# Patient Record
Sex: FEMALE | Race: WHITE | NOT HISPANIC OR LATINO | URBAN - METROPOLITAN AREA
[De-identification: names, ages, dates, MRNs, and addresses within clinical notes are randomized per-mention and may not be internally consistent; named-entity substitution may affect disease eponyms.]

---

## 2019-09-23 ENCOUNTER — EMERGENCY (EMERGENCY)
Facility: HOSPITAL | Age: 19
LOS: 1 days | Discharge: HOME | End: 2019-09-23
Admitting: EMERGENCY MEDICINE
Payer: SELF-PAY

## 2019-09-23 VITALS
HEART RATE: 99 BPM | WEIGHT: 108.03 LBS | HEIGHT: 63.78 IN | TEMPERATURE: 98 F | SYSTOLIC BLOOD PRESSURE: 124 MMHG | OXYGEN SATURATION: 97 % | DIASTOLIC BLOOD PRESSURE: 77 MMHG | RESPIRATION RATE: 16 BRPM

## 2019-09-23 PROCEDURE — 71046 X-RAY EXAM CHEST 2 VIEWS: CPT

## 2019-09-23 PROCEDURE — 94640 AIRWAY INHALATION TREATMENT: CPT

## 2019-09-23 PROCEDURE — 99284 EMERGENCY DEPT VISIT MOD MDM: CPT

## 2019-09-23 PROCEDURE — 99283 EMERGENCY DEPT VISIT LOW MDM: CPT | Mod: 25

## 2019-09-23 PROCEDURE — 71046 X-RAY EXAM CHEST 2 VIEWS: CPT | Mod: 26

## 2019-09-23 RX ORDER — IPRATROPIUM/ALBUTEROL SULFATE 18-103MCG
3 AEROSOL WITH ADAPTER (GRAM) INHALATION
Refills: 0 | Status: COMPLETED | OUTPATIENT
Start: 2019-09-23 | End: 2019-09-23

## 2019-09-23 RX ADMIN — Medication 3 MILLILITER(S): at 17:35

## 2019-09-23 NOTE — ED ADULT NURSE NOTE - NSIMPLEMENTINTERV_GEN_ALL_ED
Implemented All Fall with Harm Risk Interventions:  [place] to call system. Call bell, personal items and telephone within reach. Instruct patient to call for assistance. Room bathroom lighting operational. Non-slip footwear when patient is off stretcher. Physically safe environment: no spills, clutter or unnecessary equipment. Stretcher in lowest position, wheels locked, appropriate side rails in place. Provide visual cue, wrist band, yellow gown, etc. Monitor gait and stability. Monitor for mental status changes and reorient to person, place, and time. Review medications for side effects contributing to fall risk. Reinforce activity limits and safety measures with patient and family. Provide visual clues: red socks.

## 2019-09-23 NOTE — ED ADULT NURSE NOTE - OBJECTIVE STATEMENT
Patient AOx4 c/o of productive cough and mucous x 1 month---reports taking PO ABX with no improvement in symptoms. Speaking in full, complete sentences. Denies CP.

## 2019-09-23 NOTE — ED PROVIDER NOTE - PATIENT PORTAL LINK FT
You can access the FollowMyHealth Patient Portal offered by [facility] by registering at the following website: [email or web address] By joining [email or web address]’s FollowMyHealth portal, you will also be able to view your health information using other applications (apps) compatible with our system.

## 2019-09-23 NOTE — ED ADULT NURSE NOTE - CHIEF COMPLAINT QUOTE
cough and mucus x1 month, saw PMD before traveling from [place] and was prescribed Azithromycin but no improvement

## 2019-09-23 NOTE — ED PROVIDER NOTE - NSFOLLOWUPINSTRUCTIONS_ED_ALL_ED_FT
Cough, Adult  Image   Coughing is a reflex that clears your throat and your airways. Coughing helps to heal and protect your lungs. It is normal to cough occasionally, but a cough that happens with other symptoms or lasts a long time may be a sign of a condition that needs treatment. A cough may last only 2–3 weeks (acute), or it may last longer than 8 weeks (chronic).    What are the causes?  Coughing is commonly caused by:  Breathing in substances that irritate your lungs.  A viral or bacterial respiratory infection.  Allergies.  Asthma.  Postnasal drip.  Smoking.  Acid backing up from the stomach into the esophagus (gastroesophageal reflux).  Certain medicines.  Chronic lung problems, including COPD (or rarely, lung cancer).  Other medical conditions such as heart failure.  Follow these instructions at home:  Pay attention to any changes in your symptoms. Take these actions to help with your discomfort:  Take medicines only as told by your health care provider.  If you were prescribed an antibiotic medicine, take it as told by your health care provider. Do not stop taking the antibiotic even if you start to feel better.  Talk with your health care provider before you take a cough suppressant medicine.  Drink enough fluid to keep your urine clear or pale yellow.  If the air is dry, use a cold steam vaporizer or humidifier in your bedroom or your home to help loosen secretions.  Avoid anything that causes you to cough at work or at home.  If your cough is worse at night, try sleeping in a semi-upright position.  Avoid cigarette smoke. If you smoke, quit smoking. If you need help quitting, ask your health care provider.  Avoid caffeine.  Avoid alcohol.  Rest as needed.  Contact a health care provider if:  You have new symptoms.  You cough up pus.  Your cough does not get better after 2–3 weeks, or your cough gets worse.  You cannot control your cough with suppressant medicines and you are losing sleep.  You develop pain that is getting worse or pain that is not controlled with pain medicines.  You have a fever.  You have unexplained weight loss.  You have night sweats.  Get help right away if:  You cough up blood.  You have difficulty breathing.  Your heartbeat is very fast.  This information is not intended to replace advice given to you by your health care provider. Make sure you discuss any questions you have with your health care provider.    Document Released: 06/15/2012 Document Revised: 05/25/2017 Document Reviewed: 02/24/2016  [employer] Interactive Patient Education © 2019 [employer]

## 2019-09-23 NOTE — ED PROVIDER NOTE - CLINICAL SUMMARY MEDICAL DECISION MAKING FREE TEXT BOX
18 y/o female with fever and cough. No sob/difficulty breathing. No chest pain. No hx of smoking. Do not suspect PE or pneumothorax. Consider bronchitis vs pneumonia vs costochondritis. Do not suspect allergy/gerd or PND. Well-appearing otherwise.

## 2019-09-23 NOTE — ED PROVIDER NOTE - OBJECTIVE STATEMENT
20 y/o F with hx of pleurisy and sinusitis presents to the ED with complaints of productive cough and fevers x 1 months, Tmax 100.4 Pt states that she was seen by another doctor who diagnosed her with bronchitis started her on a Z-Pac which she finished today, with no alleviation of symptoms. Of note, pt has also used her Albuterol inhaler with limited relief of symptoms.

## 2019-09-28 DIAGNOSIS — J18.9 PNEUMONIA, UNSPECIFIED ORGANISM: ICD-10-CM

## 2019-09-28 DIAGNOSIS — R05 COUGH: ICD-10-CM

## 2021-03-10 NOTE — ED PROVIDER NOTE - CPE EDP SKIN NORM
Quality 431: Preventive Care And Screening: Unhealthy Alcohol Use - Screening: Patient screened for unhealthy alcohol use using a single question and scores less than 2 times per year Quality 226: Preventive Care And Screening: Tobacco Use: Screening And Cessation Intervention: Patient screened for tobacco use and is an ex/non-smoker Detail Level: Detailed Quality 130: Documentation Of Current Medications In The Medical Record: Current Medications Documented normal...